# Patient Record
Sex: MALE | Employment: FULL TIME | URBAN - METROPOLITAN AREA
[De-identification: names, ages, dates, MRNs, and addresses within clinical notes are randomized per-mention and may not be internally consistent; named-entity substitution may affect disease eponyms.]

---

## 2022-05-12 ENCOUNTER — OFFICE VISIT (OUTPATIENT)
Dept: PRIMARY CARE CLINIC | Age: 62
End: 2022-05-12
Payer: COMMERCIAL

## 2022-05-12 VITALS
DIASTOLIC BLOOD PRESSURE: 80 MMHG | SYSTOLIC BLOOD PRESSURE: 100 MMHG | BODY MASS INDEX: 27.73 KG/M2 | RESPIRATION RATE: 14 BRPM | OXYGEN SATURATION: 99 % | HEART RATE: 99 BPM | TEMPERATURE: 97 F | WEIGHT: 216 LBS

## 2022-05-12 DIAGNOSIS — R05.3 CHRONIC COUGH: Chronic | ICD-10-CM

## 2022-05-12 DIAGNOSIS — Z12.5 SCREENING FOR PROSTATE CANCER: ICD-10-CM

## 2022-05-12 DIAGNOSIS — E78.2 MIXED HYPERLIPIDEMIA: Chronic | ICD-10-CM

## 2022-05-12 DIAGNOSIS — R73.9 HYPERGLYCEMIA: ICD-10-CM

## 2022-05-12 DIAGNOSIS — E11.9 TYPE 2 DIABETES MELLITUS WITHOUT COMPLICATION, WITHOUT LONG-TERM CURRENT USE OF INSULIN (HCC): Primary | Chronic | ICD-10-CM

## 2022-05-12 DIAGNOSIS — I10 ESSENTIAL HYPERTENSION: Chronic | ICD-10-CM

## 2022-05-12 DIAGNOSIS — N52.9 ERECTILE DYSFUNCTION, UNSPECIFIED ERECTILE DYSFUNCTION TYPE: ICD-10-CM

## 2022-05-12 LAB — HBA1C MFR BLD: 10.6 %

## 2022-05-12 PROCEDURE — 99204 OFFICE O/P NEW MOD 45 MIN: CPT | Performed by: INTERNAL MEDICINE

## 2022-05-12 PROCEDURE — 83036 HEMOGLOBIN GLYCOSYLATED A1C: CPT | Performed by: INTERNAL MEDICINE

## 2022-05-12 PROCEDURE — 3046F HEMOGLOBIN A1C LEVEL >9.0%: CPT | Performed by: INTERNAL MEDICINE

## 2022-05-12 RX ORDER — OMEPRAZOLE 40 MG/1
40 CAPSULE, DELAYED RELEASE ORAL
Qty: 30 CAPSULE | Refills: 5 | Status: SHIPPED | OUTPATIENT
Start: 2022-05-12 | End: 2022-06-23 | Stop reason: SDUPTHER

## 2022-05-12 RX ORDER — METFORMIN HYDROCHLORIDE 500 MG/1
500 TABLET, FILM COATED, EXTENDED RELEASE ORAL
COMMUNITY
End: 2022-05-12

## 2022-05-12 RX ORDER — ASPIRIN 325 MG
1 TABLET ORAL
COMMUNITY

## 2022-05-12 SDOH — ECONOMIC STABILITY: HOUSING INSECURITY
IN THE LAST 12 MONTHS, WAS THERE A TIME WHEN YOU DID NOT HAVE A STEADY PLACE TO SLEEP OR SLEPT IN A SHELTER (INCLUDING NOW)?: NO

## 2022-05-12 SDOH — ECONOMIC STABILITY: INCOME INSECURITY: IN THE LAST 12 MONTHS, WAS THERE A TIME WHEN YOU WERE NOT ABLE TO PAY THE MORTGAGE OR RENT ON TIME?: NO

## 2022-05-12 SDOH — ECONOMIC STABILITY: TRANSPORTATION INSECURITY
IN THE PAST 12 MONTHS, HAS LACK OF TRANSPORTATION KEPT YOU FROM MEETINGS, WORK, OR FROM GETTING THINGS NEEDED FOR DAILY LIVING?: NO

## 2022-05-12 SDOH — HEALTH STABILITY: PHYSICAL HEALTH: ON AVERAGE, HOW MANY MINUTES DO YOU ENGAGE IN EXERCISE AT THIS LEVEL?: 60 MIN

## 2022-05-12 SDOH — ECONOMIC STABILITY: FOOD INSECURITY: WITHIN THE PAST 12 MONTHS, YOU WORRIED THAT YOUR FOOD WOULD RUN OUT BEFORE YOU GOT MONEY TO BUY MORE.: NEVER TRUE

## 2022-05-12 SDOH — ECONOMIC STABILITY: TRANSPORTATION INSECURITY
IN THE PAST 12 MONTHS, HAS THE LACK OF TRANSPORTATION KEPT YOU FROM MEDICAL APPOINTMENTS OR FROM GETTING MEDICATIONS?: NO

## 2022-05-12 SDOH — ECONOMIC STABILITY: HOUSING INSECURITY: IN THE LAST 12 MONTHS, HOW MANY PLACES HAVE YOU LIVED?: 1

## 2022-05-12 SDOH — HEALTH STABILITY: PHYSICAL HEALTH: ON AVERAGE, HOW MANY DAYS PER WEEK DO YOU ENGAGE IN MODERATE TO STRENUOUS EXERCISE (LIKE A BRISK WALK)?: 6 DAYS

## 2022-05-12 SDOH — ECONOMIC STABILITY: FOOD INSECURITY: WITHIN THE PAST 12 MONTHS, THE FOOD YOU BOUGHT JUST DIDN'T LAST AND YOU DIDN'T HAVE MONEY TO GET MORE.: NEVER TRUE

## 2022-05-12 ASSESSMENT — SOCIAL DETERMINANTS OF HEALTH (SDOH)
HOW HARD IS IT FOR YOU TO PAY FOR THE VERY BASICS LIKE FOOD, HOUSING, MEDICAL CARE, AND HEATING?: NOT HARD AT ALL
HOW OFTEN DO YOU ATTENT MEETINGS OF THE CLUB OR ORGANIZATION YOU BELONG TO?: NEVER
HOW OFTEN DO YOU ATTEND CHURCH OR RELIGIOUS SERVICES?: NEVER
DO YOU BELONG TO ANY CLUBS OR ORGANIZATIONS SUCH AS CHURCH GROUPS UNIONS, FRATERNAL OR ATHLETIC GROUPS, OR SCHOOL GROUPS?: NO
IN A TYPICAL WEEK, HOW MANY TIMES DO YOU TALK ON THE PHONE WITH FAMILY, FRIENDS, OR NEIGHBORS?: THREE TIMES A WEEK
HOW OFTEN DO YOU GET TOGETHER WITH FRIENDS OR RELATIVES?: THREE TIMES A WEEK

## 2022-05-12 ASSESSMENT — PATIENT HEALTH QUESTIONNAIRE - PHQ9
SUM OF ALL RESPONSES TO PHQ QUESTIONS 1-9: 0
2. FEELING DOWN, DEPRESSED OR HOPELESS: 0
SUM OF ALL RESPONSES TO PHQ9 QUESTIONS 1 & 2: 0
SUM OF ALL RESPONSES TO PHQ QUESTIONS 1-9: 0
1. LITTLE INTEREST OR PLEASURE IN DOING THINGS: 0
SUM OF ALL RESPONSES TO PHQ QUESTIONS 1-9: 0
SUM OF ALL RESPONSES TO PHQ QUESTIONS 1-9: 0

## 2022-05-12 ASSESSMENT — LIFESTYLE VARIABLES
HOW OFTEN DO YOU HAVE A DRINK CONTAINING ALCOHOL: MONTHLY OR LESS
HOW MANY STANDARD DRINKS CONTAINING ALCOHOL DO YOU HAVE ON A TYPICAL DAY: 1 OR 2

## 2022-05-12 NOTE — PROGRESS NOTES
Subjective:      Patient ID: Edgar Barkley is a 63 y.o. male. HPI  Here for a NP est,  Type 2 diabetes mellitus without complication, without long-term current use of insulin ([facility])  Diabetes Mellitus Type II:  Home blood sugar records reviewed: fasting range: 120-130. No significant episodes of hypoglycemia. Compliant with medications. No polyuria, polydipsia, visual changes, foot problems, GI upset. Diabetic diet compliance:  compliant most of the time. Current exercise: none. Will check labs, and refill medications as appropriate. Hypertension:  Denies CP, SOB, visual changes, dizziness, palpitations or HA. He is adherent to a low sodium diet. Blood pressure typically runs ok  outside of the office. Continue current medications. Hyperlipidemia:  No new myalgias or GI upset on current medications. No results found for: LABA1C  Lab Results   Component Value Date    CREATININE 0.9 08/16/2013     Lab Results   Component Value Date    AST 19 08/16/2013     No components found for: CHLPL  Lab Results   Component Value Date    TRIG 178 (H) 08/16/2013     Lab Results   Component Value Date    HDL 45 08/16/2013     Lab Results   Component Value Date    LDLCALC 180 08/16/2013      This problem was reviewed in detail. It is under control and stable. Will check blood work. Essential hypertension  This is a chronic problem. The problem is well controlled. Patient monitors readings regularly. Pertinent negatives include no chest pain, focal sensory loss, focal weakness, leg pain, myalgias or shortness of breath. No headaches or chest pain. Takes medications regularly. Blood pressure has been stable, blood work was reviewed, and advised patient to continue the current instructions or medications. Mixed hyperlipidemia  This has been a long standing problem, takes lipitor      Monitors diet and tries to follow a low fat diet. Has  been reasonably  compliant w exercise.  Lipids have been stable, The problem is controlled. Recent lipid tests were reviewed and are normal. Pertinent negatives include no chest pain, focal sensory loss, focal weakness, leg pain, myalgias or shortness of breath. Advised patient to continue the current instructions or medications. Chronic cough  This has been going on for a few months,   Could be allrgiesm  Could be GERD   Will try prilosec       Past Medical History:   Diagnosis Date    Essential hypertension 5/13/2010    Type 2 diabetes mellitus without complication, without long-term current use of insulin ([facility] 3/5/2015       Past Surgical History:   Procedure Laterality Date    CARDIOVASCULAR STRESS TEST  nov 2009    Dr Drew Valente, [place]    COLONOSCOPY  Oct 2013    Dr Lucia Mcclendon       Current Outpatient Medications   Medication Sig Dispense Refill    metFORMIN, MOD, (GLUMETZA) 500 MG extended release tablet Take 500 mg by mouth daily (with breakfast)      aspirin 325 MG tablet Take 1 tablet by mouth      fluticasone (FLONASE) 50 MCG/ACT nasal spray 2 sprays by Nasal route daily. 1 Bottle 3    lisinopril (PRINIVIL;ZESTRIL) 5 MG tablet Take 1 tablet by mouth daily. 30 tablet 1    glucose monitoring kit (FREESTYLE) monitoring kit 1 kit by Does not apply route daily as needed. 1 kit 0    atorvastatin (LIPITOR) 10 MG tablet Take 1 tablet by mouth daily. 30 tablet 6    tadalafil (CIALIS) 5 MG tablet Take 1 tablet by mouth as needed for Erectile Dysfunction for 360 days. 30 tablet 5     No current facility-administered medications for this visit.        Allergies   Allergen Reactions    Egg Yolk        Social History     Socioeconomic History    Marital status:      Spouse name: Not on file    Number of children: Not on file    Years of education: Not on file    Highest education level: Not on file   Occupational History    Occupation: [employer]   Tobacco Use    Smoking status: Former Smoker    Smokeless tobacco: Never Used   Substance and Sexual Activity    Alcohol use: Yes     Comment: occas.  Drug use: Not on file    Sexual activity: Not on file   Other Topics Concern    Not on file   Social History Narrative    Not on file     Social Determinants of Health     Financial Resource Strain: Low Risk     Difficulty of Paying Living Expenses: Not hard at all   Food Insecurity: No Food Insecurity    Worried About Running Out of Food in the Last Year: Never true    Samuel of Food in the Last Year: Never true   Transportation Needs: No Transportation Needs    Lack of Transportation (Medical): No    Lack of Transportation (Non-Medical): No   Physical Activity: Sufficiently Active    Days of Exercise per Week: 6 days    Minutes of Exercise per Session: 60 min   Stress:     Feeling of Stress : Not on file   Social Connections: Moderately Isolated    Frequency of Communication with Friends and Family: Three times a week    Frequency of Social Gatherings with Friends and Family: Three times a week    Attends Yazidi Services: Never    Active Member of Clubs or Organizations: No    Attends Club or Organization Meetings: Never    Marital Status:    Intimate Partner Violence:     Fear of Current or Ex-Partner: Not on file    Emotionally Abused: Not on file    Physically Abused: Not on file    Sexually Abused: Not on file   Housing Stability: [address] Unable to Pay for Housing in the Last Year: No    Number of [place] in the Last Year: 1    Unstable Housing in the Last Year: No       Family History   Problem Relation Age of Onset    Diabetes Father       Review of Systems  ROS: No unusual headaches or allergy symptoms or blurred vision. No prolonged cough. No flushing or facial pain or chest pain,dizziness, dyspnea, palpitations, or chest pain on exertion. No syncope. No nausea or vommitting or diarrhea. No jaundice or abdominal pain, change in bowel habits, black or bloody stools.   No dysuria or hematuria or frequency of urination. No myalgias or muscle pain. No numbness, weakness, or tingling. No falls, or loss of consciousness. No weight loss or back pain. No falls. No paresthesias. No joint swelling or redness. No joint pain. No recent weight loss. No focal weakness or sensory deficits or paresthesias, No confusion or altered sensorium. No hematemesis. No hearing loss. No siezures. All other systems were reviewed, and review was negative. Objective:   Physical Exam  /80 (Site: Left Upper Arm, Position: Sitting, Cuff Size: Medium Adult)   Pulse 99   Temp 97 °F (36.1 °C)   Resp 14   Wt 216 lb (98 kg)   SpO2 99%   BMI 27.73 kg/m²    The physical exam reveals a patient who appears well, alert and oriented x 3, pleasant, cooperative. Vitals are as noted. Head is atraumatic and normocephalic. Eyes reveal normal conjunctiva, cornea normal, pupils are equal and rective to light. Nasal mucosa is normal. Throat is normal without exudates. Ears reveal normal tympanic membranes. Neck is supple and free of adenopathy, or masses. No thyromegaly. No jugular venous distension. Lungs are clear to auscultation, no rales or rhonchi noted. Heart sounds are regular , no murmurs, clicks, gallops or rubs. Abdomen is soft, no tenderness, masses or organomegaly. Bowel sounds are normally heard. Pelvis: normal. Extremities are normal. Peripheral pulses are normal. Screening neurological exam is normal without focal findings. Cranial nerves are intact, reflexes are symmetrical and muscle strength eaqual. Skin is normal without suspicious lesions noted. Assessment:      Type 2 diabetes mellitus without complication, without long-term current use of insulin ([facility])  Diabetes Mellitus Type II:  Home blood sugar records reviewed: fasting range: 120-130. No significant episodes of hypoglycemia. Compliant with medications. No polyuria, polydipsia, visual changes, foot problems, GI upset. Diabetic diet compliance:  compliant most of the time. Current exercise: none. Will check labs, and refill medications as appropriate. Hypertension:  Denies CP, SOB, visual changes, dizziness, palpitations or HA. He is adherent to a low sodium diet. Blood pressure typically runs ok  outside of the office. Continue current medications. Hyperlipidemia:  No new myalgias or GI upset on current medications. No results found for: LABA1C  Lab Results   Component Value Date    CREATININE 0.9 08/16/2013     Lab Results   Component Value Date    AST 19 08/16/2013     No components found for: CHLPL  Lab Results   Component Value Date    TRIG 178 (H) 08/16/2013     Lab Results   Component Value Date    HDL 45 08/16/2013     Lab Results   Component Value Date    LDLCALC 180 08/16/2013      This problem was reviewed in detail. It is under control and stable. Will check blood work. Essential hypertension  This is a chronic problem. The problem is well controlled. Patient monitors readings regularly. Pertinent negatives include no chest pain, focal sensory loss, focal weakness, leg pain, myalgias or shortness of breath. No headaches or chest pain. Takes medications regularly. Blood pressure has been stable, blood work was reviewed, and advised patient to continue the current instructions or medications. Mixed hyperlipidemia  This has been a long standing problem, takes lipitor      Monitors diet and tries to follow a low fat diet. Has  been reasonably  compliant w exercise. Lipids have been stable, The problem is controlled. Recent lipid tests were reviewed and are normal. Pertinent negatives include no chest pain, focal sensory loss, focal weakness, leg pain, myalgias or shortness of breath. Advised patient to continue the current instructions or medications. Chronic cough  This has been going on for a few months,   Could be allrgiesm  Could be GERD   Will try prilosec           Plan:      Labs ordered, reviewed. Medications refilled.    All [employer] maintenance needs reviewed and the needful ordered.            Noah Cortez MD

## 2022-05-12 NOTE — ASSESSMENT & PLAN NOTE
Diabetes Mellitus Type II:  Home blood sugar records reviewed: fasting range: 120-130. No significant episodes of hypoglycemia. Compliant with medications. No polyuria, polydipsia, visual changes, foot problems, GI upset. Diabetic diet compliance:  compliant most of the time. Current exercise: none. Will check labs, and refill medications as appropriate. Hypertension:  Denies CP, SOB, visual changes, dizziness, palpitations or HA. He is adherent to a low sodium diet. Blood pressure typically runs ok  outside of the office. Continue current medications. Hyperlipidemia:  No new myalgias or GI upset on current medications. No results found for: LABA1C  Lab Results   Component Value Date    CREATININE 0.9 08/16/2013     Lab Results   Component Value Date    AST 19 08/16/2013     No components found for: CHLPL  Lab Results   Component Value Date    TRIG 178 (H) 08/16/2013     Lab Results   Component Value Date    HDL 45 08/16/2013     Lab Results   Component Value Date    LDLCALC 180 08/16/2013      This problem was reviewed in detail. It is under control and stable. Will check blood work.

## 2022-06-06 ENCOUNTER — TELEPHONE (OUTPATIENT)
Dept: PRIMARY CARE CLINIC | Age: 62
End: 2022-06-06

## 2022-06-06 DIAGNOSIS — Z12.5 SCREENING FOR PROSTATE CANCER: ICD-10-CM

## 2022-06-06 DIAGNOSIS — Z20.822 EXPOSURE TO COVID-19 VIRUS: Primary | ICD-10-CM

## 2022-06-06 DIAGNOSIS — E11.9 TYPE 2 DIABETES MELLITUS WITHOUT COMPLICATION, WITHOUT LONG-TERM CURRENT USE OF INSULIN (HCC): Chronic | ICD-10-CM

## 2022-06-06 DIAGNOSIS — Z20.822 EXPOSURE TO COVID-19 VIRUS: ICD-10-CM

## 2022-06-06 LAB
A/G RATIO: 1.7 (ref 1.1–2.2)
ALBUMIN SERPL-MCNC: 4.2 G/DL (ref 3.4–5)
ALP BLD-CCNC: 60 U/L (ref 40–129)
ALT SERPL-CCNC: 14 U/L (ref 10–40)
ANION GAP SERPL CALCULATED.3IONS-SCNC: 15 MMOL/L (ref 3–16)
AST SERPL-CCNC: 13 U/L (ref 15–37)
BASOPHILS ABSOLUTE: 0 K/UL (ref 0–0.2)
BASOPHILS RELATIVE PERCENT: 0.5 %
BILIRUB SERPL-MCNC: 0.4 MG/DL (ref 0–1)
BUN BLDV-MCNC: 16 MG/DL (ref 7–20)
CALCIUM SERPL-MCNC: 9.5 MG/DL (ref 8.3–10.6)
CHLORIDE BLD-SCNC: 99 MMOL/L (ref 99–110)
CHOLESTEROL, TOTAL: 153 MG/DL (ref 0–199)
CO2: 25 MMOL/L (ref 21–32)
CREAT SERPL-MCNC: 0.9 MG/DL (ref 0.9–1.3)
CREATININE URINE: 97.3 MG/DL (ref 39–259)
EOSINOPHILS ABSOLUTE: 0.2 K/UL (ref 0–0.6)
EOSINOPHILS RELATIVE PERCENT: 3.7 %
GFR AFRICAN AMERICAN: >60
GFR NON-AFRICAN AMERICAN: >60
GLUCOSE BLD-MCNC: 215 MG/DL (ref 70–99)
HCT VFR BLD CALC: 52.5 % (ref 40.5–52.5)
HDLC SERPL-MCNC: 36 MG/DL (ref 40–60)
HEMOGLOBIN: 17.2 G/DL (ref 13.5–17.5)
LDL CHOLESTEROL CALCULATED: 79 MG/DL
LYMPHOCYTES ABSOLUTE: 1.9 K/UL (ref 1–5.1)
LYMPHOCYTES RELATIVE PERCENT: 34 %
MCH RBC QN AUTO: 29.1 PG (ref 26–34)
MCHC RBC AUTO-ENTMCNC: 32.8 G/DL (ref 31–36)
MCV RBC AUTO: 88.6 FL (ref 80–100)
MICROALBUMIN UR-MCNC: 8.6 MG/DL
MICROALBUMIN/CREAT UR-RTO: 88.4 MG/G (ref 0–30)
MONOCYTES ABSOLUTE: 0.4 K/UL (ref 0–1.3)
MONOCYTES RELATIVE PERCENT: 7.3 %
NEUTROPHILS ABSOLUTE: 3.1 K/UL (ref 1.7–7.7)
NEUTROPHILS RELATIVE PERCENT: 54.5 %
PDW BLD-RTO: 13.9 % (ref 12.4–15.4)
PLATELET # BLD: 139 K/UL (ref 135–450)
PMV BLD AUTO: 9.2 FL (ref 5–10.5)
POTASSIUM SERPL-SCNC: 4.8 MMOL/L (ref 3.5–5.1)
PROSTATE SPECIFIC ANTIGEN: 0.24 NG/ML (ref 0–4)
RBC # BLD: 5.93 M/UL (ref 4.2–5.9)
SODIUM BLD-SCNC: 139 MMOL/L (ref 136–145)
TOTAL PROTEIN: 6.7 G/DL (ref 6.4–8.2)
TRIGL SERPL-MCNC: 190 MG/DL (ref 0–150)
TSH SERPL DL<=0.05 MIU/L-ACNC: 1.97 UIU/ML (ref 0.27–4.2)
VLDLC SERPL CALC-MCNC: 38 MG/DL
WBC # BLD: 5.6 K/UL (ref 4–11)

## 2022-06-06 NOTE — TELEPHONE ENCOUNTER
----- Message from Radha Part sent at 6/6/2022  7:13 AM EDT -----  Subject: Message to Provider    QUESTIONS  Information for Provider? patient is going on a cruise and would like to   get the covid antigen test done, he is leaving town on June 7th   ---------------------------------------------------------------------------  --------------  [address]  What is the best way for the office to contact you? OK to leave message on   voicemail  Preferred Call Back Phone Number? [phone]  ---------------------------------------------------------------------------  --------------  SCRIPT ANSWERS  Relationship to Patient?  Self

## 2022-06-07 LAB — SARS-COV-2: NOT DETECTED

## 2022-06-08 ENCOUNTER — TELEPHONE (OUTPATIENT)
Dept: PRIMARY CARE CLINIC | Age: 62
End: 2022-06-08

## 2022-06-08 NOTE — TELEPHONE ENCOUNTER
Pt called and stated that he needs Cristina or Dr. Tennille Barrios to call him back in regards to his Covid test.

## 2022-06-23 RX ORDER — OMEPRAZOLE 40 MG/1
40 CAPSULE, DELAYED RELEASE ORAL
Qty: 30 CAPSULE | Refills: 5 | Status: SHIPPED | OUTPATIENT
Start: 2022-06-23 | End: 2022-08-08 | Stop reason: SDUPTHER

## 2022-06-23 NOTE — TELEPHONE ENCOUNTER
Medication:   Requested Prescriptions     Pending Prescriptions Disp Refills    omeprazole (PRILOSEC) 40 MG delayed release capsule 30 capsule 5     Sig: Take 1 capsule by mouth every morning (before breakfast)        Last Filled:      Patient Phone Number: [phone] (home)     Last appt: 5/12/2022   Next appt: Visit date not found    Last OARRS: No flowsheet data found.     Preferred Pharmacy:   [address] [place] [address]  [id number]  Phone: [phone] Fax: [phone]    [id number] - [id number] - [address] [facility] -  [phone] [place] Number [phone]  [address] [place] [address]  Phone: [phone] Fax: [phone]    [facility], [address] [place] [address] [phone] -  [phone]  [place] [address]  Phone: [phone] Fax: [phone]

## 2022-08-08 RX ORDER — OMEPRAZOLE 40 MG/1
40 CAPSULE, DELAYED RELEASE ORAL
Qty: 30 CAPSULE | Refills: 5 | Status: SHIPPED | OUTPATIENT
Start: 2022-08-08

## 2022-10-21 NOTE — TELEPHONE ENCOUNTER
Medication:   Requested Prescriptions     Pending Prescriptions Disp Refills    metFORMIN (GLUCOPHAGE) 1000 MG tablet [Pharmacy Med Name: METFORMIN HCL TABS 1000MG] 180 tablet 3     Sig: TAKE 1 TABLET TWICE A DAY WITH MEALS     Last Filled:  05/12/2022    Last appt: 5/12/2022   Next appt: Visit date not found    Last OARRS: No flowsheet data found.

## 2022-11-22 ENCOUNTER — OFFICE VISIT (OUTPATIENT)
Dept: PRIMARY CARE CLINIC | Age: 62
End: 2022-11-22
Payer: COMMERCIAL

## 2022-11-22 ENCOUNTER — TELEPHONE (OUTPATIENT)
Dept: PRIMARY CARE CLINIC | Age: 62
End: 2022-11-22

## 2022-11-22 VITALS
RESPIRATION RATE: 14 BRPM | DIASTOLIC BLOOD PRESSURE: 72 MMHG | HEART RATE: 94 BPM | TEMPERATURE: 97.6 F | SYSTOLIC BLOOD PRESSURE: 120 MMHG | OXYGEN SATURATION: 99 % | WEIGHT: 216 LBS | BODY MASS INDEX: 27.73 KG/M2

## 2022-11-22 DIAGNOSIS — E11.9 TYPE 2 DIABETES MELLITUS WITHOUT COMPLICATION, WITHOUT LONG-TERM CURRENT USE OF INSULIN (HCC): Chronic | ICD-10-CM

## 2022-11-22 DIAGNOSIS — M54.9 UPPER BACK PAIN ON RIGHT SIDE: Primary | ICD-10-CM

## 2022-11-22 DIAGNOSIS — I10 ESSENTIAL HYPERTENSION: Chronic | ICD-10-CM

## 2022-11-22 LAB — HBA1C MFR BLD: 8.8 %

## 2022-11-22 PROCEDURE — 99214 OFFICE O/P EST MOD 30 MIN: CPT | Performed by: INTERNAL MEDICINE

## 2022-11-22 PROCEDURE — 3078F DIAST BP <80 MM HG: CPT | Performed by: INTERNAL MEDICINE

## 2022-11-22 PROCEDURE — 83036 HEMOGLOBIN GLYCOSYLATED A1C: CPT | Performed by: INTERNAL MEDICINE

## 2022-11-22 PROCEDURE — 1036F TOBACCO NON-USER: CPT | Performed by: INTERNAL MEDICINE

## 2022-11-22 PROCEDURE — G8427 DOCREV CUR MEDS BY ELIG CLIN: HCPCS | Performed by: INTERNAL MEDICINE

## 2022-11-22 PROCEDURE — G8419 CALC BMI OUT NRM PARAM NOF/U: HCPCS | Performed by: INTERNAL MEDICINE

## 2022-11-22 PROCEDURE — 3046F HEMOGLOBIN A1C LEVEL >9.0%: CPT | Performed by: INTERNAL MEDICINE

## 2022-11-22 PROCEDURE — 3074F SYST BP LT 130 MM HG: CPT | Performed by: INTERNAL MEDICINE

## 2022-11-22 PROCEDURE — G8484 FLU IMMUNIZE NO ADMIN: HCPCS | Performed by: INTERNAL MEDICINE

## 2022-11-22 PROCEDURE — 3017F COLORECTAL CA SCREEN DOC REV: CPT | Performed by: INTERNAL MEDICINE

## 2022-11-22 PROCEDURE — 2022F DILAT RTA XM EVC RTNOPTHY: CPT | Performed by: INTERNAL MEDICINE

## 2022-11-22 RX ORDER — DICLOFENAC SODIUM 75 MG/1
75 TABLET, DELAYED RELEASE ORAL 2 TIMES DAILY
Qty: 60 TABLET | Refills: 3 | Status: SHIPPED | OUTPATIENT
Start: 2022-11-22

## 2022-11-22 ASSESSMENT — PATIENT HEALTH QUESTIONNAIRE - PHQ9
SUM OF ALL RESPONSES TO PHQ QUESTIONS 1-9: 0
SUM OF ALL RESPONSES TO PHQ9 QUESTIONS 1 & 2: 0
1. LITTLE INTEREST OR PLEASURE IN DOING THINGS: 0
SUM OF ALL RESPONSES TO PHQ QUESTIONS 1-9: 0
2. FEELING DOWN, DEPRESSED OR HOPELESS: 0

## 2022-11-22 NOTE — ASSESSMENT & PLAN NOTE
Diabetes Mellitus Type II:  Home blood sugar records reviewed: fasting range: 120-130. No significant episodes of hypoglycemia. Compliant with medications. No polyuria, polydipsia, visual changes, foot problems, GI upset. Diabetic diet compliance:  compliant most of the time. Current exercise: none. Will check labs, and refill medications as appropriate. Hypertension:  Denies CP, SOB, visual changes, dizziness, palpitations or HA. He is adherent to a low sodium diet. Blood pressure typically runs ok  outside of the office. Continue current medications. Hyperlipidemia:  No new myalgias or GI upset on current medications. Lab Results   Component Value Date    LABA1C 10.6 05/12/2022     Lab Results   Component Value Date    LABMICR 8.60 (H) 06/06/2022    CREATININE 0.9 06/06/2022     Lab Results   Component Value Date    ALT 14 06/06/2022    AST 13 (L) 06/06/2022     No components found for: CHLPL  Lab Results   Component Value Date    TRIG 190 (H) 06/06/2022     Lab Results   Component Value Date    HDL 36 (L) 06/06/2022     Lab Results   Component Value Date/Time    LDLCALC 79 06/06/2022 10:05 AM       This problem is stable, reviewed in detail, and advised patient to continue the current instructions or medications. Will continue to closely monitor the situation.

## 2022-11-22 NOTE — PROGRESS NOTES
Subjective:      Patient ID: Nick Au is a 63 y.o. male. HPI  C/o rt upper back pain for 10 days ago,  Has been bowling at the [employer],   Started w that,   Has pain when he lifts his shoulder up,   May overworked the shoulder,   Did not try any meds,   Did not try heat or cold,   Type 2 diabetes mellitus without complication, without long-term current use of insulin ([facility]  Diabetes Mellitus Type II:  Home blood sugar records reviewed: fasting range: 120-130. No significant episodes of hypoglycemia. Compliant with medications. No polyuria, polydipsia, visual changes, foot problems, GI upset. Diabetic diet compliance:  compliant most of the time. Current exercise: none. Will check labs, and refill medications as appropriate. Hypertension:  Denies CP, SOB, visual changes, dizziness, palpitations or HA. He is adherent to a low sodium diet. Blood pressure typically runs ok  outside of the office. Continue current medications. Hyperlipidemia:  No new myalgias or GI upset on current medications. Lab Results   Component Value Date    LABA1C 10.6 05/12/2022     Lab Results   Component Value Date    LABMICR 8.60 (H) 06/06/2022    CREATININE 0.9 06/06/2022     Lab Results   Component Value Date    ALT 14 06/06/2022    AST 13 (L) 06/06/2022     No components found for: CHLPL  Lab Results   Component Value Date    TRIG 190 (H) 06/06/2022     Lab Results   Component Value Date    HDL 36 (L) 06/06/2022     Lab Results   Component Value Date/Time    LDLCALC 79 06/06/2022 10:05 AM       This problem is stable, reviewed in detail, and advised patient to continue the current instructions or medications. Will continue to closely monitor the situation. Essential hypertension  This is a chronic problem. The problem is well controlled. Patient monitors readings regularly. Pertinent negatives include no chest pain, focal sensory loss, focal weakness, leg pain, myalgias or shortness of breath.  No headaches or chest pain. Takes medications regularly. Blood pressure has been stable, blood work was reviewed, and advised patient to continue the current instructions or medications. Review of Systems  All the review of systems negative except above   Objective:   Physical Exam  /72 (Site: Left Upper Arm, Position: Sitting, Cuff Size: Medium Adult)   Pulse 94   Temp 97.6 °F (36.4 °C)   Resp 14   Wt 216 lb (98 kg)   SpO2 99%   BMI 27.73 kg/m²    The physical exam reveals a patient who appears well, alert and oriented x 3, pleasant, cooperative. Vitals are as noted. Head is atraumatic and normocephalic. Eyes reveal normal conjunctiva, cornea normal, pupils are equal and rective to light. Nasal mucosa is normal. Throat is normal without exudates. Ears reveal normal tympanic membranes. Neck is supple and free of adenopathy, or masses. No thyromegaly. No jugular venous distension. Lungs are clear to auscultation, no rales or rhonchi noted. Heart sounds are regular , no murmurs, clicks, gallops or rubs. Abdomen is soft, no tenderness, masses or organomegaly. Bowel sounds are normally heard. Pelvis: normal. Extremities are normal. Peripheral pulses are normal. Screening neurological exam is normal without focal findings. Cranial nerves are intact, reflexes are symmetrical and muscle strength eaqual. Skin is normal without suspicious lesions noted. Assessment:      Upper back pain rt side,  Type 2 diabetes mellitus without complication, without long-term current use of insulin ([facility])  Diabetes Mellitus Type II:  Home blood sugar records reviewed: fasting range: 120-130. No significant episodes of hypoglycemia. Compliant with medications. No polyuria, polydipsia, visual changes, foot problems, GI upset. Diabetic diet compliance:  compliant most of the time. Current exercise: none. Will check labs, and refill medications as appropriate.     Hypertension:  Denies CP, SOB, visual changes, dizziness, palpitations or HA. He is adherent to a low sodium diet. Blood pressure typically runs ok  outside of the office. Continue current medications. Hyperlipidemia:  No new myalgias or GI upset on current medications. Lab Results   Component Value Date    LABA1C 10.6 05/12/2022     Lab Results   Component Value Date    LABMICR 8.60 (H) 06/06/2022    CREATININE 0.9 06/06/2022     Lab Results   Component Value Date    ALT 14 06/06/2022    AST 13 (L) 06/06/2022     No components found for: CHLPL  Lab Results   Component Value Date    TRIG 190 (H) 06/06/2022     Lab Results   Component Value Date    HDL 36 (L) 06/06/2022     Lab Results   Component Value Date/Time    LDLCALC 79 06/06/2022 10:05 AM       This problem is stable, reviewed in detail, and advised patient to continue the current instructions or medications. Will continue to closely monitor the situation. Essential hypertension  This is a chronic problem. The problem is well controlled. Patient monitors readings regularly. Pertinent negatives include no chest pain, focal sensory loss, focal weakness, leg pain, myalgias or shortness of breath. No headaches or chest pain. Takes medications regularly. Blood pressure has been stable, blood work was reviewed, and advised patient to continue the current instructions or medications. Plan: Will try diclofenac   PT, rest   Call us if no better in 2 weeks. May need further evaluation.          Susanne Garcia MD

## 2022-11-22 NOTE — TELEPHONE ENCOUNTER
Patient called to change the referral for physical therapist because he is not getting any appointment there. He got an appointment in [address] [place] [address] on Monday at 5' clock.  So he requested to send the order to [facility]( TI--[phone],  [facility]--[phone] )    Pl review and advice    Thanks

## 2023-06-26 RX ORDER — OMEPRAZOLE 40 MG/1
CAPSULE, DELAYED RELEASE ORAL
Qty: 30 CAPSULE | Refills: 0 | Status: SHIPPED | OUTPATIENT
Start: 2023-06-26 | End: 2023-06-28 | Stop reason: SDUPTHER

## 2023-06-28 ENCOUNTER — OFFICE VISIT (OUTPATIENT)
Dept: PRIMARY CARE CLINIC | Age: 63
End: 2023-06-28
Payer: COMMERCIAL

## 2023-06-28 VITALS
HEART RATE: 95 BPM | BODY MASS INDEX: 27.73 KG/M2 | TEMPERATURE: 97.6 F | OXYGEN SATURATION: 99 % | WEIGHT: 216 LBS | DIASTOLIC BLOOD PRESSURE: 72 MMHG | RESPIRATION RATE: 13 BRPM | SYSTOLIC BLOOD PRESSURE: 120 MMHG

## 2023-06-28 DIAGNOSIS — I10 ESSENTIAL HYPERTENSION: Chronic | ICD-10-CM

## 2023-06-28 DIAGNOSIS — E78.2 MIXED HYPERLIPIDEMIA: Chronic | ICD-10-CM

## 2023-06-28 DIAGNOSIS — E11.9 TYPE 2 DIABETES MELLITUS WITHOUT COMPLICATION, WITHOUT LONG-TERM CURRENT USE OF INSULIN (HCC): Primary | Chronic | ICD-10-CM

## 2023-06-28 DIAGNOSIS — Z12.5 SCREENING FOR PROSTATE CANCER: ICD-10-CM

## 2023-06-28 LAB — HBA1C MFR BLD: 9.1 %

## 2023-06-28 PROCEDURE — 3074F SYST BP LT 130 MM HG: CPT | Performed by: INTERNAL MEDICINE

## 2023-06-28 PROCEDURE — 3078F DIAST BP <80 MM HG: CPT | Performed by: INTERNAL MEDICINE

## 2023-06-28 PROCEDURE — 99214 OFFICE O/P EST MOD 30 MIN: CPT | Performed by: INTERNAL MEDICINE

## 2023-06-28 PROCEDURE — G8427 DOCREV CUR MEDS BY ELIG CLIN: HCPCS | Performed by: INTERNAL MEDICINE

## 2023-06-28 PROCEDURE — 4004F PT TOBACCO SCREEN RCVD TLK: CPT | Performed by: INTERNAL MEDICINE

## 2023-06-28 PROCEDURE — 83037 HB GLYCOSYLATED A1C HOME DEV: CPT | Performed by: INTERNAL MEDICINE

## 2023-06-28 PROCEDURE — G8419 CALC BMI OUT NRM PARAM NOF/U: HCPCS | Performed by: INTERNAL MEDICINE

## 2023-06-28 PROCEDURE — 2022F DILAT RTA XM EVC RTNOPTHY: CPT | Performed by: INTERNAL MEDICINE

## 2023-06-28 PROCEDURE — 3017F COLORECTAL CA SCREEN DOC REV: CPT | Performed by: INTERNAL MEDICINE

## 2023-06-28 PROCEDURE — 3046F HEMOGLOBIN A1C LEVEL >9.0%: CPT | Performed by: INTERNAL MEDICINE

## 2023-06-28 RX ORDER — OMEPRAZOLE 40 MG/1
40 CAPSULE, DELAYED RELEASE ORAL
Qty: 90 CAPSULE | Refills: 3 | Status: SHIPPED | OUTPATIENT
Start: 2023-06-28

## 2023-06-28 SDOH — ECONOMIC STABILITY: FOOD INSECURITY: WITHIN THE PAST 12 MONTHS, THE FOOD YOU BOUGHT JUST DIDN'T LAST AND YOU DIDN'T HAVE MONEY TO GET MORE.: NEVER TRUE

## 2023-06-28 SDOH — ECONOMIC STABILITY: FOOD INSECURITY: WITHIN THE PAST 12 MONTHS, YOU WORRIED THAT YOUR FOOD WOULD RUN OUT BEFORE YOU GOT MONEY TO BUY MORE.: NEVER TRUE

## 2023-06-28 SDOH — ECONOMIC STABILITY: INCOME INSECURITY: HOW HARD IS IT FOR YOU TO PAY FOR THE VERY BASICS LIKE FOOD, HOUSING, MEDICAL CARE, AND HEATING?: NOT HARD AT ALL

## 2023-06-28 ASSESSMENT — PATIENT HEALTH QUESTIONNAIRE - PHQ9
1. LITTLE INTEREST OR PLEASURE IN DOING THINGS: 0
SUM OF ALL RESPONSES TO PHQ QUESTIONS 1-9: 0
SUM OF ALL RESPONSES TO PHQ QUESTIONS 1-9: 0
SUM OF ALL RESPONSES TO PHQ9 QUESTIONS 1 & 2: 0
SUM OF ALL RESPONSES TO PHQ QUESTIONS 1-9: 0
SUM OF ALL RESPONSES TO PHQ QUESTIONS 1-9: 0
2. FEELING DOWN, DEPRESSED OR HOPELESS: 0

## 2023-10-16 NOTE — TELEPHONE ENCOUNTER
Medication:   Requested Prescriptions     Pending Prescriptions Disp Refills    metFORMIN (GLUCOPHAGE) 1000 MG tablet [Pharmacy Med Name: METFORMIN HCL TABS 1000MG] 180 tablet 3     Sig: TAKE 1 TABLET TWICE A DAY WITH MEALS     Last Filled:  10/21/2022    Last appt: 6/28/2023   Next appt: Visit date not found    Last Labs DM:   Lab Results   Component Value Date/Time    LABA1C 9.1 06/28/2023 11:32 AM

## 2024-08-15 RX ORDER — OMEPRAZOLE 40 MG/1
40 CAPSULE, DELAYED RELEASE ORAL
Qty: 90 CAPSULE | Refills: 3 | OUTPATIENT
Start: 2024-08-15

## 2024-08-15 NOTE — TELEPHONE ENCOUNTER
Medication:   Requested Prescriptions     Pending Prescriptions Disp Refills    omeprazole (PRILOSEC) 40 MG delayed release capsule [Pharmacy Med Name: OMEPRAZOLE DR CAPS 40MG] 90 capsule 3     Sig: TAKE 1 CAPSULE EVERY MORNING BEFORE BREAKFAST     Last Filled:  06/28/23      Last appt: 6/28/2023   Next appt: Visit date not found    Last OARRS:        No data to display

## 2024-10-04 ENCOUNTER — OFFICE VISIT (OUTPATIENT)
Dept: PRIMARY CARE CLINIC | Age: 64
End: 2024-10-04
Payer: COMMERCIAL

## 2024-10-04 VITALS
SYSTOLIC BLOOD PRESSURE: 120 MMHG | HEART RATE: 80 BPM | BODY MASS INDEX: 27.08 KG/M2 | RESPIRATION RATE: 13 BRPM | DIASTOLIC BLOOD PRESSURE: 88 MMHG | HEIGHT: 74 IN | OXYGEN SATURATION: 99 % | TEMPERATURE: 97.6 F | WEIGHT: 211 LBS

## 2024-10-04 DIAGNOSIS — E11.9 TYPE 2 DIABETES MELLITUS WITHOUT COMPLICATION, WITHOUT LONG-TERM CURRENT USE OF INSULIN (HCC): Chronic | ICD-10-CM

## 2024-10-04 DIAGNOSIS — E78.2 MIXED HYPERLIPIDEMIA: Chronic | ICD-10-CM

## 2024-10-04 DIAGNOSIS — Z12.5 SCREENING FOR PROSTATE CANCER: ICD-10-CM

## 2024-10-04 DIAGNOSIS — Z01.818 PRE-OP EVALUATION: Primary | ICD-10-CM

## 2024-10-04 DIAGNOSIS — I10 ESSENTIAL HYPERTENSION: Chronic | ICD-10-CM

## 2024-10-04 DIAGNOSIS — G89.29 CHRONIC PAIN OF RIGHT KNEE: ICD-10-CM

## 2024-10-04 DIAGNOSIS — M25.561 CHRONIC PAIN OF RIGHT KNEE: ICD-10-CM

## 2024-10-04 LAB — HBA1C MFR BLD: 10 %

## 2024-10-04 PROCEDURE — 3074F SYST BP LT 130 MM HG: CPT | Performed by: INTERNAL MEDICINE

## 2024-10-04 PROCEDURE — G8484 FLU IMMUNIZE NO ADMIN: HCPCS | Performed by: INTERNAL MEDICINE

## 2024-10-04 PROCEDURE — 93000 ELECTROCARDIOGRAM COMPLETE: CPT | Performed by: INTERNAL MEDICINE

## 2024-10-04 PROCEDURE — G8419 CALC BMI OUT NRM PARAM NOF/U: HCPCS | Performed by: INTERNAL MEDICINE

## 2024-10-04 PROCEDURE — 4004F PT TOBACCO SCREEN RCVD TLK: CPT | Performed by: INTERNAL MEDICINE

## 2024-10-04 PROCEDURE — 83036 HEMOGLOBIN GLYCOSYLATED A1C: CPT | Performed by: INTERNAL MEDICINE

## 2024-10-04 PROCEDURE — 2022F DILAT RTA XM EVC RTNOPTHY: CPT | Performed by: INTERNAL MEDICINE

## 2024-10-04 PROCEDURE — 3079F DIAST BP 80-89 MM HG: CPT | Performed by: INTERNAL MEDICINE

## 2024-10-04 PROCEDURE — G8427 DOCREV CUR MEDS BY ELIG CLIN: HCPCS | Performed by: INTERNAL MEDICINE

## 2024-10-04 PROCEDURE — 3017F COLORECTAL CA SCREEN DOC REV: CPT | Performed by: INTERNAL MEDICINE

## 2024-10-04 PROCEDURE — 99215 OFFICE O/P EST HI 40 MIN: CPT | Performed by: INTERNAL MEDICINE

## 2024-10-04 PROCEDURE — 3046F HEMOGLOBIN A1C LEVEL >9.0%: CPT | Performed by: INTERNAL MEDICINE

## 2024-10-04 RX ORDER — GLIMEPIRIDE 2 MG/1
2 TABLET ORAL 2 TIMES DAILY
Qty: 60 TABLET | Refills: 5 | Status: SHIPPED | OUTPATIENT
Start: 2024-10-04

## 2024-10-04 SDOH — ECONOMIC STABILITY: FOOD INSECURITY: WITHIN THE PAST 12 MONTHS, YOU WORRIED THAT YOUR FOOD WOULD RUN OUT BEFORE YOU GOT MONEY TO BUY MORE.: NEVER TRUE

## 2024-10-04 SDOH — ECONOMIC STABILITY: FOOD INSECURITY: WITHIN THE PAST 12 MONTHS, THE FOOD YOU BOUGHT JUST DIDN'T LAST AND YOU DIDN'T HAVE MONEY TO GET MORE.: NEVER TRUE

## 2024-10-04 SDOH — ECONOMIC STABILITY: INCOME INSECURITY: HOW HARD IS IT FOR YOU TO PAY FOR THE VERY BASICS LIKE FOOD, HOUSING, MEDICAL CARE, AND HEATING?: NOT HARD AT ALL

## 2024-10-04 ASSESSMENT — PATIENT HEALTH QUESTIONNAIRE - PHQ9
SUM OF ALL RESPONSES TO PHQ QUESTIONS 1-9: 0
SUM OF ALL RESPONSES TO PHQ9 QUESTIONS 1 & 2: 0
SUM OF ALL RESPONSES TO PHQ QUESTIONS 1-9: 0
2. FEELING DOWN, DEPRESSED OR HOPELESS: NOT AT ALL
SUM OF ALL RESPONSES TO PHQ QUESTIONS 1-9: 0
SUM OF ALL RESPONSES TO PHQ QUESTIONS 1-9: 0
1. LITTLE INTEREST OR PLEASURE IN DOING THINGS: NOT AT ALL

## 2024-10-04 NOTE — ASSESSMENT & PLAN NOTE
Diabetes Mellitus Type II:  Home blood sugar records reviewed: fasting range: 120-150 .  No significant episodes of hypoglycemia. Compliant with medications.  No polyuria, polydipsia, visual changes, foot problems, GI upset.  Diabetic diet compliance:  noncompliant: has not had BW in a while,  .  Current exercise: none. Will check labs, and refill medications as appropriate.    Hypertension:  Denies CP, SOB, visual changes, dizziness, palpitations or HA.  He is adherent to a low sodium diet.  Blood pressure typically runs ok  outside of the office. Continue current medications.    Hyperlipidemia:  No new myalgias or GI upset on current medications.       Lab Results   Component Value Date    LABA1C 9.1 06/28/2023    LABA1C 8.8 11/22/2022    LABA1C 10.6 05/12/2022     Lab Results   Component Value Date    CREATININE 0.9 06/06/2022     Lab Results   Component Value Date    ALT 14 06/06/2022    AST 13 (L) 06/06/2022     No components found for: \"CHLPL\"  Lab Results   Component Value Date    TRIG 190 (H) 06/06/2022     Lab Results   Component Value Date    HDL 36 (L) 06/06/2022     No results found for: \"LDLDIRECT\"   Will need to check BW and A1C.

## 2024-10-04 NOTE — PROGRESS NOTES
soft, non-tender. BS normal. No masses, organomegaly  Extremities - Extremities normal. No deformities, edema, or skin discolora  Musculoskeletal - Spine ROM normal. Muscular strength intact.  Peripheral pulses - radial=2+,, femoral=2+, popliteal=2+, dorsalis pedis=2+,  Neuro - Gait normal. Reflexes normal and symmetric. Sensation grossly normal.  No focal weakness    EKG: NSR normal EKG.    BLOOD WORK: TBD.      Assessment:      Pre op eval  Chronic pain of right knee  Here for a pre op eval for right TKR,  He is medically stable.      Type 2 diabetes mellitus without complication, without long-term current use of insulin (HCC)  Diabetes Mellitus Type II:  Home blood sugar records reviewed: fasting range: 120-150 .  No significant episodes of hypoglycemia. Compliant with medications.  No polyuria, polydipsia, visual changes, foot problems, GI upset.  Diabetic diet compliance:  noncompliant: has not had BW in a while,  .  Current exercise: none. Will check labs, and refill medications as appropriate.    Hypertension:  Denies CP, SOB, visual changes, dizziness, palpitations or HA.  He is adherent to a low sodium diet.  Blood pressure typically runs ok  outside of the office. Continue current medications.    Hyperlipidemia:  No new myalgias or GI upset on current medications.       Lab Results   Component Value Date    LABA1C 9.1 06/28/2023    LABA1C 8.8 11/22/2022    LABA1C 10.6 05/12/2022     Lab Results   Component Value Date    CREATININE 0.9 06/06/2022     Lab Results   Component Value Date    ALT 14 06/06/2022    AST 13 (L) 06/06/2022     No components found for: \"CHLPL\"  Lab Results   Component Value Date    TRIG 190 (H) 06/06/2022     Lab Results   Component Value Date    HDL 36 (L) 06/06/2022     No results found for: \"LDLDIRECT\"   Will need to check BW and A1C.       Mixed hyperlipidemia  This has been a long standing problem, takes lipitor      Monitors diet and tries to follow a low fat diet. Has  been

## 2024-11-01 LAB — DIABETIC RETINOPATHY: NEGATIVE

## 2024-11-07 ENCOUNTER — TELEPHONE (OUTPATIENT)
Dept: PRIMARY CARE CLINIC | Age: 64
End: 2024-11-07

## 2024-11-07 DIAGNOSIS — E11.9 TYPE 2 DIABETES MELLITUS WITHOUT COMPLICATION, WITHOUT LONG-TERM CURRENT USE OF INSULIN (HCC): Primary | ICD-10-CM

## 2024-11-26 ENCOUNTER — TELEPHONE (OUTPATIENT)
Dept: PRIMARY CARE CLINIC | Age: 64
End: 2024-11-26

## 2024-11-26 NOTE — TELEPHONE ENCOUNTER
There is no need for this test. His hba1c is accurate. It will not be covered by insurance. Also he needs to get the fasting blood work that he is overdue for and is already ordered.

## 2024-12-03 ENCOUNTER — OFFICE VISIT (OUTPATIENT)
Dept: PRIMARY CARE CLINIC | Age: 64
End: 2024-12-03
Payer: COMMERCIAL

## 2024-12-03 VITALS
SYSTOLIC BLOOD PRESSURE: 128 MMHG | BODY MASS INDEX: 26.71 KG/M2 | DIASTOLIC BLOOD PRESSURE: 80 MMHG | TEMPERATURE: 97.6 F | HEART RATE: 82 BPM | OXYGEN SATURATION: 99 % | WEIGHT: 208 LBS | RESPIRATION RATE: 13 BRPM

## 2024-12-03 DIAGNOSIS — I10 ESSENTIAL HYPERTENSION: ICD-10-CM

## 2024-12-03 DIAGNOSIS — Z01.818 PRE-OP EVALUATION: Primary | ICD-10-CM

## 2024-12-03 DIAGNOSIS — G89.29 CHRONIC PAIN OF RIGHT KNEE: ICD-10-CM

## 2024-12-03 DIAGNOSIS — E78.2 MIXED HYPERLIPIDEMIA: ICD-10-CM

## 2024-12-03 DIAGNOSIS — M25.561 CHRONIC PAIN OF RIGHT KNEE: ICD-10-CM

## 2024-12-03 DIAGNOSIS — E11.9 TYPE 2 DIABETES MELLITUS WITHOUT COMPLICATION, WITHOUT LONG-TERM CURRENT USE OF INSULIN (HCC): Chronic | ICD-10-CM

## 2024-12-03 PROCEDURE — G8427 DOCREV CUR MEDS BY ELIG CLIN: HCPCS | Performed by: INTERNAL MEDICINE

## 2024-12-03 PROCEDURE — 3074F SYST BP LT 130 MM HG: CPT | Performed by: INTERNAL MEDICINE

## 2024-12-03 PROCEDURE — 3079F DIAST BP 80-89 MM HG: CPT | Performed by: INTERNAL MEDICINE

## 2024-12-03 PROCEDURE — 3017F COLORECTAL CA SCREEN DOC REV: CPT | Performed by: INTERNAL MEDICINE

## 2024-12-03 PROCEDURE — 4004F PT TOBACCO SCREEN RCVD TLK: CPT | Performed by: INTERNAL MEDICINE

## 2024-12-03 PROCEDURE — G8419 CALC BMI OUT NRM PARAM NOF/U: HCPCS | Performed by: INTERNAL MEDICINE

## 2024-12-03 PROCEDURE — G8484 FLU IMMUNIZE NO ADMIN: HCPCS | Performed by: INTERNAL MEDICINE

## 2024-12-03 PROCEDURE — 3046F HEMOGLOBIN A1C LEVEL >9.0%: CPT | Performed by: INTERNAL MEDICINE

## 2024-12-03 PROCEDURE — 2022F DILAT RTA XM EVC RTNOPTHY: CPT | Performed by: INTERNAL MEDICINE

## 2024-12-03 PROCEDURE — 99215 OFFICE O/P EST HI 40 MIN: CPT | Performed by: INTERNAL MEDICINE

## 2024-12-03 NOTE — PROGRESS NOTES
controlled. Recent lipid tests were reviewed and are normal. Pertinent negatives include no chest pain, focal sensory loss, focal weakness, leg pain, myalgias or shortness of breath.  Advised patient to continue the current instructions or medications.        Essential hypertension  This is a chronic problem. The problem is well controlled.  Patient monitors readings regularly. Pertinent negatives include no chest pain, focal sensory loss, focal weakness, leg pain, myalgias or shortness of breath. No headaches or chest pain. Takes medications regularly.  Blood pressure has been stable, blood work was reviewed, and advised patient to continue the current instructions or medications.         Past Medical History        Past Medical History:   Diagnosis Date    Essential hypertension 5/13/2010    Type 2 diabetes mellitus without complication, without long-term current use of insulin ([facility]) 3/5/2015         No  previous anesthesia complications.        Past Surgical History         Past Surgical History:   Procedure Laterality Date    CARDIOVASCULAR STRESS TEST   nov 2009     Dr Ac Curry, [place]    COLONOSCOPY   Oct 2013     Dr Salinas                                                       Current Facility-Administered Medications          Current Outpatient Medications   Medication Sig Dispense Refill    metFORMIN (GLUCOPHAGE) 1000 MG tablet TAKE 1 TABLET TWICE A DAY WITH MEALS 180 tablet 3    omeprazole (PRILOSEC) 40 MG delayed release capsule Take 1 capsule by mouth every morning (before breakfast) 90 capsule 3    aspirin 325 MG tablet Take 1 tablet by mouth        lisinopril (PRINIVIL;ZESTRIL) 5 MG tablet Take 1 tablet by mouth daily. 30 tablet 1    atorvastatin (LIPITOR) 10 MG tablet Take 1 tablet by mouth daily. 30 tablet 6    diclofenac (VOLTAREN) 75 MG EC tablet Take 1 tablet by mouth 2 times daily (Patient not taking: Reported on 10/4/2024) 60 tablet 3    fluticasone (FLONASE) 50 MCG/ACT nasal spray 2

## 2024-12-31 NOTE — TELEPHONE ENCOUNTER
Medication:   Requested Prescriptions     Pending Prescriptions Disp Refills    metFORMIN (GLUCOPHAGE) 1000 MG tablet [Pharmacy Med Name: METFORMIN HCL TABS 1000MG] 180 tablet 3     Sig: TAKE 1 TABLET TWICE A DAY WITH MEALS     Last Filled:  10/16/2023    Last appt: 12/3/2024   Next appt: 1/10/2025    Last Labs DM:   Lab Results   Component Value Date/Time    LABA1C 10.0 10/04/2024 10:54 AM

## 2025-05-27 RX ORDER — GLIMEPIRIDE 2 MG/1
TABLET ORAL
Qty: 60 TABLET | Refills: 0 | Status: SHIPPED | OUTPATIENT
Start: 2025-05-27

## 2025-05-27 NOTE — TELEPHONE ENCOUNTER
Medication:   Requested Prescriptions     Pending Prescriptions Disp Refills    glimepiride (AMARYL) 2 MG tablet [Pharmacy Med Name: GLIMEPIRIDE 2 MG TABLET] 60 tablet 5     Sig: TAKE 1 TABLET BY MOUTH EVERY MORNING AND EVERY NIGHT AT BEDTIME     Last Filled:  10/04/2024    Last appt: 12/3/2024   Next appt: Visit date not found    Last Labs DM:   Lab Results   Component Value Date/Time    LABA1C 10.0 10/04/2024 10:54 AM

## 2025-06-03 NOTE — ADDENDUM NOTE
Addended bySandeep Conley on: 11/22/2022 10:32 AM     Modules accepted: Orders PHYSICAL MEDICINE AND REHABILITATION CONSULT NOTE  Deonte Greenfield 76 y.o. male MRN: [id number]  Unit/Bed#: [facility] 962-01 Encounter: [id number]     Rehab Diagnosis: Impairment of mobility, safety and Activities of Daily Living (ADLs) due to Brain Dysfunction:  02.22  Traumatic, Closed Injury. Acute on Chronic bilateral SDH    History of Present Illness:   Deonte Greenfield is a 76 y.o. male with PMH diabetes, CKD stage 2, HTN, HLD, thrombocytopenia, gout, bilateral total hip arthroplasty  who initially presented to the [facility] on in early April due to unwitnessed fall and found to have small subdural hemorrhage which subsequently appeared to resolve upon repeat CT head imaging. Patient opted to have MRI performed in outpatient setting and MRI team sent him to ER for large bilateral high convexity SDHs with mass effect.     He was sent to the [facility] where CTH confirmed findings on CT scan. He was admitted to ICU and underwent lake hole placement with subdural drain placement. CTA on 5/26 showed bilateral mixed density subdural collections are decreased in size s/p lake hole drainage with subdural drain placement. He was not a candidate for MMA given poor vessel quality and lack of right MMA (high risk for complication). Completed 7 day course Keppra for seizure prophylaxis.    On 5/30, patient with change in mental status. Repeat CTH with acute appearing infarct right superior cerebellum (later confirmed on MRI). Neurology was consulted, with recommendations for loop recorder outpatient, initiating ASA once cleared by Neurosurgery. He was admitted to [facility] on 6/3/25.    Plan:     Rehabilitation  Functional deficits: impaired mobility, self care  Begin PT/OT/SLP.  Rehabilitation goals are to achieve a modified independent level with mobility and self care.  Prognosis is fair.  ELOS is 10-14 days.  Estimated discharge is home.     DVT prophylaxis  Lovenox 30 mg BID    Pain  No pain endorsed  on admission, will have Tylenol and topicals available    Bladder plan  Continent    Bowel plan  Continent    Code Status  Full code    Incidental Findings  Patient had MRI abdomen completed outpatient (5/22, d/t jaundice & thrombocytopenia), with numerous pancreatic cystic lesions concerning for malignancy. This showed numerous cystic lesions scattered throughout the pancreas that were concerning for malignancy at this time.  Patient's family was informed of these and are aware that following discharge from acute rehab they will need to follow-up with the surgical oncology team as well as GI for further evaluation and treatment of these.  Patient and spouse both aware.    Acute CVA (cerebrovascular accident) (HCC)  Assessment & Plan  --MRI brain 5/24:  New, large bilateral high convexity subdural hematomas, right greater than left, which are predominantly subacute in timing, with mass effect on the bilateral cerebral hemispheres, and effacement of the sulci. No significant midline shift noted. FLAIR signal hyperintensity along the sulci of the right frontal and parietal convexities, which may represent trace associated subarachnoid hemorrhage. No acute infarct. Mild chronic white matter microangiopathic changes involving both cerebral hemispheres.  --CT head 5/30: Acute appearing infarct in right superior cerebellum. Increase in volume of hyperdense blood products bilaterally  --Echo 4/2025: EF 65%. Normal systolic function. Grade 1 diastolic relaxation. Small mobile septal aneurysm without associated thrombus  MRI 5/31: Evolving recent right cerebellar infarct with mild local mass effect.   LDL 62  A1c 6.2  TTE: 65%     Given new stroke on MRI with concurrent SDH, patient will need ASA when cleared by NSGY after repeat CTH. Given recent negative ziopatch in April 2025, will need loop recorder outpatient.      Lipitor 10 mg qHS  Normotension  Start ASA 81 mg when cleared by NSGY for AP/AC, potentially after CTH  completed 6/11 if stable  Outpatient loop recorder  PT/OT  SLP to evaluate language and cognition s/p cerebral infarction    * Acute on chronic intracranial subdural hematoma (HCC)  Assessment & Plan  > Originally presented in early April due to unwitnessed fall and found to have small subdural hemorrhage which subsequently appeared to resolve upon repeat CT head imaging. Patient opted to have MRI performed in outpatient setting and MRI team sent him to ER for large bilateral high convexity SDHs with mass effect. Presented to ED on 5/24 where he received bilateral Crouse holes with 4 subdural drains placed  > Drains removed on 5/27 and 5/28  > completed 7 days of Keppra for seizure prophylaxis    Repeat CT head STAT if GCS declines more than 2 points in 1 hour  SBP < 160  INR < 1.4  Plt > 100  Hold all therapeutic doses of AC / AP therapy, he is not cleared to start asa per neurology recommendations for stroke management  Ok for dvt prophylaxis, continue lovenox  PT/OT    Oropharyngeal dysphagia  Assessment & Plan  Last seen by SLP on 6/1/25    - continue recommended diet of regular texture and thin liquids  - Aspiration precautions and compensatory swallowing strategies: upright posture, slow rate of feeding, small bites/sips, and alternating bites and sips   - SLP consulted    Left shoulder pain  Assessment & Plan  - new pain for patient  - pain at AC joint, but could not provoke pain other than on palpation  - Differential includes AC arthritis, glenohumeral arthritis, MSK strain, rotator cuff tendinopahty  - Doesn't seem like needs K-tape as no sulcus sign  - PT/OT  - XR if hindering therapies    Superficial foreign body of left leg without major open wound and without infection  Assessment & Plan  -wound care consulted while in acute care  - Left Pretibial leg wound: cleanse left lower leg wound with soap and water, pat dry, apply xeroform to wound bed and cover with foam dressing. Change every other day.     CKD  (chronic kidney disease)  Assessment & Plan  Lab Results   Component Value Date    EGFR 94 06/03/2025    EGFR 89 06/02/2025    EGFR 88 05/31/2025    CREATININE 0.68 06/03/2025    CREATININE 0.76 06/02/2025    CREATININE 0.78 05/31/2025     - Adequate GFR while here, previously diagnosed with CKD 2  - has appointment to establish with nephrology on 7/21/25    HTN (hypertension)  Assessment & Plan  Home meds: atenolol-chlorthalidone 50-25, valsartan 320  Here: atenolol 50 mg  - continue titration for normotension    Pancreatic cyst  Assessment & Plan  > Patient had MRI abdomen completed outpatient (5/22, d/t jaundice & thrombocytopenia), with numerous pancreatic cystic lesions concerning for malignancy. This showed numerous cystic lesions scattered throughout the pancreas that were concerning for malignancy at this time.   >Patient's family was informed of these and are aware that following discharge from acute rehab they will need to follow-up with the surgical oncology team as well as GI for further evaluation and treatment of these.  Patient and spouse both aware.    - Surg Onc & GI referrals placed by trauma surgery during acute hospitalization discharge    Gout  Assessment & Plan  - continue Allopurinol    Normocytic anemia  Assessment & Plan  - baseline 12ish, now ranging at 10-11 likely in the setting of blood loss  - Continue iron supplementation    Mixed hyperlipidemia  Assessment & Plan  - Continue statin  - Last LDL: 62 on 5/31/2025    Thrombocytopenia (HCC)  Assessment & Plan  Baseline 100-250  Trend biweekly  Goal > 100  OP follow up with heme onc    Diabetes mellitus (HCC)  Assessment & Plan  Lab Results   Component Value Date    HGBA1C 6.2 (H) 05/24/2025       Recent Labs     06/02/25  1621 06/02/25  2057 06/03/25  0730 06/03/25  1124   POCGLU 130 111 101 112       Blood Sugar Average: Last 72 hrs:    - plan to restart Metformin during [facility] stay  - continue SSI and accuchecks per  "IM      Subjective/Interval Events:   Pt seen this afternoon upon arrival to [facility].     Pain: Reports pain at left shoulder that is a new pain for him that is improved with rest and voltaren gel. It is located at his AC joint. He cannot define a movement that makes it worse     Sleep: Sleeping well in recliner at night    Bowel: continent of bowel. Has been receiving Miralax, senna, and docusate during acute hospitalization with last BM 6/2, but endorses constipation before that    Bladder: continent of bladder.     Denies fever, chills, headaches, changes in vision, chest pain, shortness of breath, presyncope, abdominal pain, nausea, diarrhea, constipation, and insomnia.     Does endorse some challenges with memory since his original fall.    Review of Systems: A 10-point review of systems was performed. Negative except as listed above.      Function:  Prior level of function and living situation:  Patient resides in a  two level home with [facility] and 1 step to enter the home. He has a walk in shower, shower chair. He has been sleeping in chair at home recently due to right hip discomfort. He lives with his spouds and children.    PLOF: modified independent with ADL's and ambulation with cane and RW. Patient required family/friends to drive him to appointments and with certain iADL's    Current level of function:  Physical Therapy: Froy transfers, Froy ambulation with '  Occupational Therapy: Froy UB dressing/bathing, Rafi LB bathing/dressing, Froy transfers, supervision ADL's  Speech Therapy: mild pharyngeal dysphagia, remaining on regular diet and thin liquids    Physical Exam:  /62 (BP Location: Left arm)   Pulse 63   Temp 98.1 °F (36.7 °C) (Oral)   Resp 16   Ht 5' 5\" (1.651 m)   SpO2 98%   BMI 35.78 kg/m²      No intake or output data in the 24 hours ending 06/03/25 1712    Body mass index is 35.78 kg/m².    Gen: No acute distress, Well-nourished, well-appearing.  HEENT: Moist mucus membranes, " Bilateral temporal incisions C/D/I with overlying staples  Cardiovascular: warm extremities, 2+ pulses at distal extremities  Heme/Extr: Hyperpigmented non pitting edema to mid shins in bilateral lower extremities  Pulmonary: Non-labored breathing. Normal chest rise and fall  : No qureshi  GI: Soft, non-tender, non-distended. BS+  MSK: ROM is WFL in all extremities. No effusions or deformities. Bulk is symmetric.   Left shoulder: Neer's, Hawkin's maru, scarf, yergason's, Carey's all negative. Pain at AC point  Integumentary: Skin is warm, dry. No rashes or ulcers.  Neuro: AAOx3, CN 2-12 intact. Sensation intact to light touch throughout. Speech is intact. Appropriate to questioning. Tone is normal.    Coord: FTN with dysmetria on left but seems to be due to pain in the left shoulder, NICHOLAS, and hand roll all WNL     MMT:   Strength:   Right  Left  Site  Right  Left  Site    5 5  S Ab: Shoulder Abductors  5  5  HF: Hip Flexors    5 5  EF: Elbow Flexors  5  5 KF: Knee Flexors    5  5  EE: Elbow Extensors  5  5  KE: Knee Extensors    5  5  WE: Wrist Extensors  5  5  DR: Dorsi Flexors    5  5  FF: Finger Flexors  5  5  PF: Plantar Flexors    5  5  HI: Hand Intrinsics  5  5  EHL: Extensor Hallucis Longus   Psych: Normal mood and affect.        Labs, medications, and imaging personally reviewed.    Laboratory:    Lab Results   Component Value Date    SODIUM 135 06/03/2025    K 4.1 06/03/2025     06/03/2025    CO2 28 06/03/2025    BUN 21 06/03/2025    CREATININE 0.68 06/03/2025    GLUC 116 06/03/2025    CALCIUM 9.1 06/03/2025     Lab Results   Component Value Date    WBC 7.45 06/03/2025    HGB 10.6 (L) 06/03/2025    HCT 33.1 (L) 06/03/2025    MCV 94 06/03/2025     06/03/2025     Lab Results   Component Value Date    INR 1.20 (H) 05/31/2025    INR 1.09 05/25/2025    INR 1.13 05/24/2025    PROTIME 15.5 (H) 05/31/2025    PROTIME 14.4 05/25/2025    PROTIME 15.2 (H) 05/24/2025       Current  Medications[1]  Allergies[2]   Patient Active Problem List    Diagnosis Date Noted    Acute CVA (cerebrovascular accident) ([facility]) 05/30/2025    Acute on chronic intracranial subdural hematoma ([facility]) 05/24/2025    Oropharyngeal dysphagia 06/03/2025    Gout 06/03/2025    Pancreatic cyst 06/03/2025    HTN (hypertension) 06/03/2025    CKD (chronic kidney disease) 06/03/2025    Superficial foreign body of left leg without major open wound and without infection 06/03/2025    Left shoulder pain 06/03/2025    Weight loss 05/28/2025    Frailty 05/28/2025    Acute pain 05/28/2025    Insomnia 05/28/2025    At risk for delirium 05/28/2025    Leg swelling 05/24/2025    Severe obesity (BMI 35.0-39.9) with comorbidity ([facility]) 05/16/2025    Bradycardia, sinus 04/21/2025    Atrial septal aneurysm 04/09/2025    Normocytic anemia 04/09/2025    Syncope 04/07/2025    Thrombocytopenia ([facility]) 04/07/2025    Abnormal echocardiogram 04/07/2025    Groin pain, right 04/06/2025    Fall 04/05/2025    Hypocitraturia 01/18/2024    Neuropathy 09/12/2019    Hyperuricemia 06/03/2016    Mixed hyperlipidemia 06/03/2016    Onychomycosis 12/16/2015    PVD (peripheral vascular disease) ([facility]) 10/07/2015    POPPY (obstructive sleep apnea) 10/07/2015    Diabetes mellitus ([facility]) 08/24/2015    S/P hip replacement 11/05/2014    Osteoarthritis 04/14/2008     Past Medical History[3]  Past Surgical History[4]  Social History     Socioeconomic History    Marital status: /Civil Union     Spouse name: Not on file    Number of children: Not on file    Years of education: Not on file    Highest education level: Not on file   Occupational History    Not on file   Tobacco Use    Smoking status: Former     Types: Cigarettes    Smokeless tobacco: Never   Vaping Use    Vaping status: Never Used   Substance and Sexual Activity    Alcohol use: Not Currently    Drug use: Never    Sexual activity: Not on file   Other Topics Concern    Not on file   Social History Narrative     Not on file     Social Drivers of Health     Financial Resource Strain: Low Risk  (2024)    Received from [facility]    Overall Financial Resource Strain (CARDIA)     Difficulty of Paying Living Expenses: Not hard at all   Food Insecurity: No Food Insecurity (6/3/2025)    Nursing - Inadequate Food Risk Classification     Worried About Running Out of Food in the Last Year: Never true     Ran Out of Food in the Last Year: Never true     Ran Out of Food in the Last Year: Never true   Transportation Needs: No Transportation Needs (6/3/2025)    Nursing - Transportation Risk Classification     Lack of Transportation: Not on file     Lack of Transportation: No   Physical Activity: Inactive (2024)    Received from [facility]    Exercise Vital Sign     On average, how many days per week do you engage in moderate to strenuous exercise (like a brisk walk)?: 0 days     On average, how many minutes do you engage in exercise at this level?: 0 min   Stress: No Stress Concern Present (2025)    [employer] - Occupational Stress Questionnaire     Feeling of Stress : Not at all   Social Connections: Not on file   Intimate Partner Violence: Unknown (6/3/2025)    Nursing IPS     Feels Physically and Emotionally Safe: Not on file     Physically Hurt by Someone: Not on file     Humiliated or Emotionally Abused by Someone: Not on file     Physically Hurt by Someone: No     Hurt or Threatened by Someone: No   Housing Stability: Unknown (6/3/2025)    Nursing: Inadequate Housing Risk Classification     Has Housing: Not on file     Worried About Losing Housing: Not on file     Unable to Get Utilities: Not on file     Unable to Pay for Housing in the Last Year: No     Has Housin     Tobacco Use History[5]  Social History     Substance and Sexual Activity   Alcohol Use Not Currently     Family History[6]      Medical Necessity Criteria for [facility] Admission: Anemia, with  the following plan: monitor H/H, Hypertension, T2DM, Bowel/Bladder Management, CKD2, thrombocytopenia, and Delirium/Agitation/Encephalopathy. In addition, the preadmission screen, post-admission physical evaluation, overall plan of care and admissions order demonstrate a reasonable expectation that the following criteria were met at the time of admission to the [facility]  The patient requires active and ongoing therapeutic intervention of multiple therapy disciplines (physical therapy, occupational therapy, speech-language pathology, or prosthetics/orthotics), one of which is physical or occupational therapy.    Patient requires an intensive rehabilitation therapy program, as defined in Chapter 1, section 110.2.2 of the CMS Medicare Policy Manual. This intensive rehabilitation therapy program will consist of at least 3 hours of therapy per day at least 5 days per week or at least 15 hours of intensive rehabilitation therapy within a 7 consecutive day period, beginning with the date of admission to the [facility]    The patient is reasonably expected to actively participate in, and benefit significantly from, the intensive rehabilitation therapy program as defined in Chapter 1, section 110.2.2 of the CMS Medicare Policy Manual at this time of admission to the [facility] He can reasonably be expected to make measurable improvement (that will be of practical value to improve the patient’s functional capacity or adaptation to impairments) as a result of the rehabilitation treatment, as defined in section 110.3, and such improvement can be expected to be made within the prescribed period of time. As noted in the CMS Medicare Policy Manual, the patient need not be expected to achieve complete independence in the domain of self-care nor be expected to return to his or her prior level of functioning in order to meet this standard.  The patient must require physician supervision by a rehabilitation physician. As such, a rehabilitation  physician will conduct face-to-face visits with the patient at least 3 days per week throughout the patient’s stay in the AR[facility] to assess the patient both medically and functionally, as well as to modify the course of treatment as needed to maximize the patient’s capacity to benefit from the rehabilitation process.  The patient requires an intensive and coordinated interdisciplinary approach to providing rehabilitation, as defined in Chapter 1, section 110.2.5 of the CMS Medicare Policy Manual. This will be achieved through periodic team conferences, conducted at least once in a 7-day period, and comprising of an interdisciplinary team of medical professionals consisting of: a rehabilitation physician, registered nurse,  and/or , and a licensed/certified therapist from each therapy discipline involved in treating the patient.     Changes Since Pre-admission Assessment: None -This patient's participation in rehab continues to be reasonable, necessary and appropriate.    CMS Required Post-Admission Physician Evaluation Elements  History and Physical, including medical history, functional history and active comorbidities as in above text.     Post-Admission Physician Evaluation:  The patient has the potential to make improvement and is in need of physical, occupational, and/or therapy services. The patient may also need nutritional services. Given the patient's complex medical condition and risk of further medical complications, rehabilitative services cannot be safely provided at a lower level of care, such as a skilled nursing facility. I have reviewed the patient's functional and medical status at the time of the preadmission screening and they are the same as on the day of this admission. I acknowledge that I have personally performed a full physical examination on this patient within 24 hours of admission. The patient and/or family demonstrated understanding the rehabilitation program and  the discharge process after we discussed them.     Agree in entirety: yes  Minor adaptions: none    Major changes: none    Martinez Ramirez MD  PGY-2 Resident Physician  Physical Medicine and Rehabilitation  [facility]         [1]   Current Facility-Administered Medications:     acetaminophen (TYLENOL) tablet 650 mg, 650 mg, Oral, Q6H PRN, JOSE ALEJANDRO Bradley    [START ON 6/4/2025] allopurinol (ZYLOPRIM) tablet 300 mg, 300 mg, Oral, Daily, JOSE ALEJANDRO Bradley    [START ON 6/4/2025] atenolol (TENORMIN) tablet 50 mg, 50 mg, Oral, Daily **AND** [Held by provider] chlorthalidone tablet 25 mg, 25 mg, Oral, Daily, JOSE ALEJANDRO Bradley    atorvastatin (LIPITOR) tablet 10 mg, 10 mg, Oral, HS, JOSE ALEJANDRO Bradley    chlorhexidine (PERIDEX) 0.12 % oral rinse 15 mL, 15 mL, Mouth/Throat, Q12H BRIDGET, JOSE ALEJANDRO Bradley    Diclofenac Sodium (VOLTAREN) 1 % topical gel 2 g, 2 g, Topical, 4x Daily PRN, JOSE ALEJANDRO Bradley    enoxaparin (LOVENOX) subcutaneous injection 30 mg, 30 mg, Subcutaneous, Q12H BRIDGET, JOSE ALEJANDRO Bradley    [START ON 6/4/2025] ferrous sulfate tablet 325 mg, 325 mg, Oral, Every Other Day, JOSE ALEJANDRO Bradley    hydrALAZINE (APRESOLINE) tablet 10 mg, 10 mg, Oral, Q8H PRN, JOSE ALEJANDRO Bradley    insulin lispro (HumALOG/ADMELOG) 100 units/mL subcutaneous injection 1-5 Units, 1-5 Units, Subcutaneous, TID AC **AND** Fingerstick Glucose (POCT), , , TID AC, JOSE ALEJANDRO Bradley    insulin lispro (HumALOG/ADMELOG) 100 units/mL subcutaneous injection 1-5 Units, 1-5 Units, Subcutaneous, HS, JOSE ALEJANDRO Bradley    [Held by provider] losartan (COZAAR) tablet 100 mg, 100 mg, Oral, Daily, JOSE ALEJANDRO Bradley    [START ON 6/4/2025] polyethylene glycol (MIRALAX) packet 17 g, 17 g, Oral, Daily, JOSE ALEJANDRO Bradley    senna-docusate sodium (SENOKOT S) 8.6-50 mg per tablet 1 tablet, 1 tablet, Oral, HS,  JOSE ALEJANDRO Bradley  [2] No Known Allergies  [3]   Past Medical History:  Diagnosis Date    Diabetes mellitus (HCC)     History of kidney stones 04/05/2025    Hypertension    [4]   Past Surgical History:  Procedure Laterality Date    KYLIE HOLE FOR SUBDURAL HEMATOMA Bilateral 5/25/2025    Procedure: KYLIE HOLES;  Surgeon: Issac Grady MD;  Location: [facility];  Service: Neurosurgery    TOTAL HIP ARTHROPLASTY Bilateral    [5]   Social History  Tobacco Use   Smoking Status Former    Types: Cigarettes   Smokeless Tobacco Never   [6] No family history on file.